# Patient Record
Sex: FEMALE | Race: WHITE | Employment: OTHER | URBAN - METROPOLITAN AREA
[De-identification: names, ages, dates, MRNs, and addresses within clinical notes are randomized per-mention and may not be internally consistent; named-entity substitution may affect disease eponyms.]

---

## 2017-01-03 ENCOUNTER — HOSPITAL ENCOUNTER (OUTPATIENT)
Dept: PHYSICAL THERAPY | Age: 72
Discharge: HOME | End: 2017-01-03
Payer: MEDICARE

## 2017-01-03 PROCEDURE — 97112 NEUROMUSCULAR REEDUCATION: CPT

## 2017-01-03 PROCEDURE — 97110 THERAPEUTIC EXERCISES: CPT

## 2017-01-03 PROCEDURE — 97032 APPL MODALITY 1+ESTIM EA 15: CPT

## 2017-01-03 NOTE — PROGRESS NOTES
PT DAILY TREATMENT NOTE - [facility]     Patient Name: Sharla Vivas  Date:1/3/2017  : 1945  [x]  Patient  Verified  Payor: [place] MED[place] / Plan: [place] MEDICARE PART A & B / Product Type: Medicare /    In time:11:00  Out time:11:52  Total Treatment Time (min): 52  Total Timed Codes (min): 52  1:1 Treatment Time ( only): 40   Visit #: 2 of 4    Treatment Area: Low back pain [M54.5]  Cervicalgia [M54.2]    SUBJECTIVE  Pain Level (0-10 scale): 2 neck  Any medication changes, allergies to medications, adverse drug reactions, diagnosis change, or new procedure performed?: [x] No    [] Yes (see summary sheet for update)  Subjective functional status/changes:   [] No changes reported  \"My neck is bothering me a little this morning but my back and hip are OK\".      OBJECTIVE    Modality rationale: decrease pain and increase tissue extensibility to improve the patients ability to improve activity tolerance   Min Type Additional Details    [] Estim:  []Unatt       []IFC  []Premod                        []Other:  []w/ice   []w/heat  Position:  Location:   8 [x] Estim: [x]Att    []TENS instruct  []NMES                    [x]Other: [employer], 1.0 w/cm2, 1.0 mHz  [x]w/US   []w/ice   []w/heat  Position: sitting  Location: left UT    []  Traction: [] Cervical       []Lumbar                       [] Prone          []Supine                       []Intermittent   []Continuous Lbs:  [] before manual  [] after manual    []  Ultrasound: []Continuous   [] Pulsed                           []1MHz   []3MHz W/cm2:  Location:    []  Iontophoresis with dexamethasone         Location: [] Take home patch   [] In clinic    []  Ice     []  heat  []  Ice massage  []  Laser   []  Anodyne Position:  Location:    []  Laser with stim  []  Other:  Position:  Location:    []  Vasopneumatic Device Pressure:       [] lo [] med [] hi   Temperature: [] lo [] med [] hi   [] Skin assessment post-treatment:  []intact []redness- no adverse reaction []redness  adverse reaction:     29 min Therapeutic Exercise:  [x] See flow sheet :   Rationale: increase ROM and increase strength to improve the patients ability to tolerate ADLs     15 min Neuromuscular Re-education:  [x]  See flow sheet :   Rationale: increase strength, improve coordination and increase proprioception  to improve the patients ability to tolerate functional tasks          With   [] TE   [] TA   [] neuro   [] other: Patient Education: [x] Review HEP    [] Progressed/Changed HEP based on:   [] positioning   [] body mechanics   [] transfers   [] heat/ice application    [] other:      Other Objective/Functional Measures: Increased reps per flow sheet. Added 1# weights to prone letters to improve scapular strength. FOTO: 63 points l/s, 60 points c/s. Pain Level (0-10 scale) post treatment: 1 neck    ASSESSMENT/Changes in Function: Pt reported decreased neck pain at the end of the session. Continue POC as tolerated. Patient will continue to benefit from skilled PT services to modify and progress therapeutic interventions, address functional mobility deficits, address ROM deficits, address strength deficits, analyze and address soft tissue restrictions, analyze and cue movement patterns, analyze and modify body mechanics/ergonomics, assess and modify postural abnormalities and instruct in home and community integration to attain remaining goals. []  See Plan of Care  []  See progress note/recertification  []  See Discharge Summary         Progress towards goals / Updated goals:  Updated Goals to be accomplished in 2 weeks: continue towards unmet goals  1. Improve both C/S and L/S FOTO by >5 points to indicate improved function with daily activities. - Met, FOTO C/S 62%, L/S 59%. 12/12/2016; 63 points l/s, 60 points c/s 1/3/2017  2. Increase B hip strength to grossly to 4/5 to improve tolerate for prolonged standing. - Progressing, MMT (B) hips 4-/5 to 4/5 grossly.  12/20/2016; fatigues with s/l banded exercises 12/30/2016  3. Increase L C/S rot to 50 to increase ease with driving.  Progressed to 42 degrees 12/08/2016, MET 52 degrees 12-21-16    PLAN  [x]  Upgrade activities as tolerated     [x]  Continue plan of care  [x]  Update interventions per flow sheet       []  Discharge due to:_  []  Other:_      Patsy Pereyra PT 1/3/2017  11:40 AM    Future Appointments  Date Time Provider Levi Ramseyi   1/3/2017 11:00 [facility]   1/5/2017 8:30 AM [facility]   1/10/2017 9:30 AM [facility]

## 2017-01-05 ENCOUNTER — HOSPITAL ENCOUNTER (OUTPATIENT)
Dept: PHYSICAL THERAPY | Age: 72
Discharge: HOME | End: 2017-01-05
Payer: MEDICARE

## 2017-01-05 PROCEDURE — 97032 APPL MODALITY 1+ESTIM EA 15: CPT

## 2017-01-05 PROCEDURE — 97112 NEUROMUSCULAR REEDUCATION: CPT

## 2017-01-05 NOTE — PROGRESS NOTES
PT DAILY TREATMENT NOTE - [facility]     Patient Name: Gema Suazo  Date:2017  : 1945  [x]  Patient  Verified  Payor: Kevin Drafts / Plan: [place] MEDICARE PART A & B / Product Type: Medicare /    In time:8:32  Out time:9:08  Total Treatment Time (min): 36  Total Timed Codes (min): 36  1:1 Treatment Time ( [address] only): 36   Visit #: 3 of 4    Treatment Area: Low back pain [M54.5]  Cervicalgia [M54.2]    SUBJECTIVE  Pain Level (0-10 scale): 1 Neck, 2 (R) hip. Any medication changes, allergies to medications, adverse drug reactions, diagnosis change, or new procedure performed?: [x] No    [] Yes (see summary sheet for update)  Subjective functional status/changes:   [x] No changes reported    OBJECTIVE    Modality rationale: decrease pain and increase tissue extensibility to improve the patients ability to perform ADL's.    Min Type Additional Details    [] Estim:  []Unatt       []IFC  []Premod                        []Other:  []w/ice   []w/heat  Position:  Location:   8 [x] Estim: [x]Att    []TENS instruct  []NMES                    [x]Other: [employer] Combo [x]w/US   []w/ice   []w/heat  Position: Seated  Location:(L) UT    []  Traction: [] Cervical       []Lumbar                       [] Prone          []Supine                       []Intermittent   []Continuous Lbs:  [] before manual  [] after manual    []  Ultrasound: []Continuous   [] Pulsed                           []1MHz   []3MHz W/cm2:  Location:    []  Iontophoresis with dexamethasone         Location: [] Take home patch   [] In clinic    []  Ice     []  heat  []  Ice massage  []  Laser   []  Anodyne Position:  Location:    []  Laser with stim  []  Other:  Position:  Location:    []  Vasopneumatic Device Pressure:       [] lo [] med [] hi   Temperature: [] lo [] med [] hi   [] Skin assessment post-treatment:  []intact []redness- no adverse reaction    []redness  adverse reaction:     18 min Therapeutic Exercise:  [x] See flow sheet :   Rationale: increase ROM and increase strength to improve the patients ability to perform ADL's. 10 min Neuromuscular Re-education:  [x]  See flow sheet :   Rationale: increase strength and increase proprioception  to improve the patients ability to perform functional activities. With   [x] TE   [] TA   [] neuro   [] other: Patient Education: [x] Review HEP    [] Progressed/Changed HEP based on:   [] positioning   [] body mechanics   [] transfers   [] heat/ice application    [] other:      Other Objective/Functional Measures: Noted improved tolerance with yellow t-band clamshells, no rest required. Pain Level (0-10 scale) post treatment: Neck 0/10, (R) Hip 2/10    ASSESSMENT/Changes in Function: Pt reports performing ADL's with little to no pain, report evert intensity and frequency has decreased. Patient will continue to benefit from skilled PT services to modify and progress therapeutic interventions, address functional mobility deficits, address ROM deficits, address strength deficits, analyze and address soft tissue restrictions and analyze and modify body mechanics/ergonomics to attain remaining goals. [x]  See Plan of Care  []  See progress note/recertification  []  See Discharge Summary         Progress towards goals / Updated goals:  Updated Goals to be accomplished in 2 weeks: continue towards unmet goals  1. Improve both C/S and L/S FOTO by >5 points to indicate improved function with daily activities. - Met, FOTO C/S 62%, L/S 59%. 12/12/2016; 63 points l/s, 60 points c/s 1/3/2017  2. Increase B hip strength to grossly to 4/5 to improve tolerate for prolonged standing. - Progressing, MMT (B) hips 4-/5 to 4/5 grossly. 12/20/2016; fatigues with s/l banded exercises 12/30/2016; Noted improved tolerance with yellow t-band clamshells, no rest required. 1/5/2017  3. Increase L C/S rot to 50 to increase ease with driving.  Progressed to 42 degrees 12/08/2016, MET 52 degrees 12-21-16    PLAN  []  Upgrade activities as tolerated     [x]  Continue plan of care  []  Update interventions per flow sheet       []  Discharge due to:_  []  Other:_      Esha García PTA 1/5/2017  8:35 AM    Future Appointments  Date Time Provider Levi Bartlett   1/10/2017 9:30 AM Esha García PTA [id number]

## 2017-01-10 ENCOUNTER — APPOINTMENT (OUTPATIENT)
Dept: PHYSICAL THERAPY | Age: 72
End: 2017-01-10
Payer: MEDICARE

## 2017-01-26 ENCOUNTER — HOSPITAL ENCOUNTER (OUTPATIENT)
Dept: PHYSICAL THERAPY | Age: 72
Discharge: HOME | End: 2017-01-26
Payer: MEDICARE

## 2017-01-26 PROCEDURE — G8980 MOBILITY D/C STATUS: HCPCS

## 2017-01-26 PROCEDURE — G8979 MOBILITY GOAL STATUS: HCPCS

## 2017-01-26 PROCEDURE — 97112 NEUROMUSCULAR REEDUCATION: CPT

## 2017-01-26 PROCEDURE — 97110 THERAPEUTIC EXERCISES: CPT

## 2017-01-26 NOTE — PROGRESS NOTES
PT DAILY TREATMENT NOTE - [facility]     Patient Name: Froylan Knight  Date:2017  : 1945  [x]  Patient  Verified  Payor: Trevon Ice / Plan: [place] MEDICARE PART A & B / Product Type: Medicare /    In time:12:00  Out time:12:41  Total Treatment Time (min): 41  Total Timed Codes (min): 41  1:1 Treatment Time ([address] only): 38   Visit #: 4 of 4    Treatment Area: Low back pain [M54.5]  Cervicalgia [M54.2]    SUBJECTIVE  Pain Level (0-10 scale): 1/10  Any medication changes, allergies to medications, adverse drug reactions, diagnosis change, or new procedure performed?: [x] No    [] Yes (see summary sheet for update)  Subjective functional status/changes:   [] No changes reported  Pt reports she traveled to [place] and had no significant difficulties. Pt states she occasionally has LBP/stiffness but is not limited from any normal activity. OBJECTIVE    31 min Therapeutic Exercise:  [x] See flow sheet :   Rationale: increase ROM and increase strength to improve the patients ability to tolerate ADLs. 10 min Neuromuscular Re-education:  [x]  See flow sheet :   Rationale: increase strength, improve coordination and increase proprioception  to improve the patients ability to perform functional activities. With   [] TE   [] TA   [] neuro   [] other: Patient Education: [x] Review HEP    [] Progressed/Changed HEP based on:   [] positioning   [] body mechanics   [] transfers   [] heat/ice application    [] other:      Other Objective/Functional Measures: (B) hip MMT 4/5 all planes. FOTO:  L/S 61; C/S 68     Pain Level (0-10 scale) post treatment: 0/10    ASSESSMENT/Changes in Function: Pt has met all functional goals. Pt demonstrates improved cervical mobility and improved LE strength. Issued updated HEP and DC instructions.       []  See Plan of Care  []  See progress note/recertification  [x]  See Discharge Summary         Progress towards goals / Updated goals:  Updated Goals to be accomplished in 2 weeks: continue towards unmet goals  1. Improve both C/S and L/S FOTO by >5 points to indicate improved function with daily activities. - Met, FOTO C/S 62%, L/S 59%. 12/12/2016; 63 points l/s, 60 points c/s 1/3/2017  2. Increase B hip strength to grossly to 4/5 to improve tolerate for prolonged standing. - (B) hip MMT grossly 4/5. 01/26/17  3. Increase L C/S rot to 50 to increase ease with driving. Progressed to 42 degrees 12/08/2016, MET 52 degrees 12-21-16    PLAN  []  Upgrade activities as tolerated     []  Continue plan of care  []  Update interventions per flow sheet       [x]  Discharge due to:all functional goals met. []  Other:_      Nancy Rojas, PTA 1/26/2017  1:37 PM    No future appointments.

## 2017-02-27 NOTE — PROGRESS NOTES
[facility]  [address]  [facility], [address]  [phone] fax    Physical Therapy Discharge Summary    Patient name: James Calvert Start of Care: 16   Referral source: Stan Nuñez DO : 1945   Medical/Treatment Diagnosis: Low back pain [M54.5]  Cervicalgia [M54.2] Onset Date:1/year ago   Prior Hospitalization: see medical history Provider#: [id number]   Medications: Verified on Patient Summary List     Comorbidities: HTN; OA; skin CA  Prior Level of Function: Able to tolerate prolonged sitting and standing without increase in pain; able to garden without pain    Visits from Start of Care: 12    Missed Visits: 0  Reporting Period : 2016 to 2017    Summary of Care:  Updated Goals to be accomplished in 2 weeks: continue towards unmet goals  1. Improve both C/S and L/S FOTO by >5 points to indicate improved function with daily activities. - Met, FOTO C/S 62%, L/S 59%. 2016; 63 points l/s, 60 points c/s 1/3/2017  2. Increase B hip strength to grossly to 4/5 to improve tolerate for prolonged standing. - (B) hip MMT grossly 4/5. 17  3. Increase L C/S rot to 50 to increase ease with driving. Progressed to 42 degrees 2016, MET 52 degrees 16    G-Codes (GP)  Mobility    Goal  CJ= 20-39%  D/C  CJ= 20-39%    The severity rating is based on clinical judgment and the FOTO score. ASSESSMENT/RECOMMENDATIONS: Pt has met all functional goals. Pt demonstrates improved cervical mobility and improved LE strength. Issued updated HEP and DC instructions.    [x]Discontinue therapy: [x]Patient has reached or is progressing toward set goals      []Patient is non-compliant or has abdicated      []Due to lack of appreciable progress towards set goals    Milan Aranda, PT 2017 12:40 PM

## 2021-11-12 ENCOUNTER — TRANSCRIBE ORDER (OUTPATIENT)
Dept: SCHEDULING | Age: 76
End: 2021-11-12

## 2021-11-12 DIAGNOSIS — R10.11 RUQ ABDOMINAL PAIN: Primary | ICD-10-CM

## 2021-11-16 ENCOUNTER — HOSPITAL ENCOUNTER (OUTPATIENT)
Age: 76
Setting detail: OBSERVATION
Discharge: HOME | End: 2021-11-17
Attending: EMERGENCY MEDICINE | Admitting: SURGERY
Payer: MEDICARE

## 2021-11-16 ENCOUNTER — ANESTHESIA (OUTPATIENT)
Dept: SURGERY | Age: 76
End: 2021-11-16
Payer: MEDICARE

## 2021-11-16 ENCOUNTER — ANESTHESIA EVENT (OUTPATIENT)
Dept: SURGERY | Age: 76
End: 2021-11-16
Payer: MEDICARE

## 2021-11-16 ENCOUNTER — HOSPITAL ENCOUNTER (OUTPATIENT)
Dept: CT IMAGING | Age: 76
Discharge: HOME | End: 2021-11-16

## 2021-11-16 DIAGNOSIS — R10.11 RUQ ABDOMINAL PAIN: ICD-10-CM

## 2021-11-16 DIAGNOSIS — K35.80 ACUTE APPENDICITIS, UNSPECIFIED ACUTE APPENDICITIS TYPE: Primary | ICD-10-CM

## 2021-11-16 PROBLEM — K35.30 ACUTE APPENDICITIS WITH LOCALIZED PERITONITIS, WITHOUT PERFORATION, ABSCESS, OR GANGRENE: Status: ACTIVE | Noted: 2021-11-16

## 2021-11-16 LAB
ANION GAP SERPL CALC-SCNC: 8 MMOL/L (ref 5–15)
BASOPHILS # BLD: 0.1 K/UL (ref 0–0.1)
BASOPHILS NFR BLD: 1 % (ref 0–1)
BUN SERPL-MCNC: 14 MG/DL (ref 6–20)
BUN/CREAT SERPL: 17 (ref 12–20)
CALCIUM SERPL-MCNC: 9.3 MG/DL (ref 8.5–10.1)
CHLORIDE SERPL-SCNC: 104 MMOL/L (ref 97–108)
CO2 SERPL-SCNC: 25 MMOL/L (ref 21–32)
CREAT BLD-MCNC: 0.7 MG/DL (ref 0.6–1.3)
CREAT SERPL-MCNC: 0.83 MG/DL (ref 0.55–1.02)
DIFFERENTIAL METHOD BLD: ABNORMAL
EOSINOPHIL # BLD: 0 K/UL (ref 0–0.4)
EOSINOPHIL NFR BLD: 0 % (ref 0–7)
ERYTHROCYTE [DISTWIDTH] IN BLOOD BY AUTOMATED COUNT: 12.9 % (ref 11.5–14.5)
GLUCOSE SERPL-MCNC: 115 MG/DL (ref 65–100)
HCT VFR BLD AUTO: 37.8 % (ref 35–47)
HGB BLD-MCNC: 12.6 G/DL (ref 11.5–16)
IMM GRANULOCYTES # BLD AUTO: 0.1 K/UL (ref 0–0.04)
IMM GRANULOCYTES NFR BLD AUTO: 1 % (ref 0–0.5)
LACTATE SERPL-SCNC: 1.1 MMOL/L (ref 0.4–2)
LYMPHOCYTES # BLD: 4 K/UL (ref 0.8–3.5)
LYMPHOCYTES NFR BLD: 42 % (ref 12–49)
MCH RBC QN AUTO: 30.7 PG (ref 26–34)
MCHC RBC AUTO-ENTMCNC: 33.3 G/DL (ref 30–36.5)
MCV RBC AUTO: 92 FL (ref 80–99)
MONOCYTES # BLD: 0.8 K/UL (ref 0–1)
MONOCYTES NFR BLD: 8 % (ref 5–13)
NEUTS SEG # BLD: 4.6 K/UL (ref 1.8–8)
NEUTS SEG NFR BLD: 48 % (ref 32–75)
NRBC # BLD: 0 K/UL (ref 0–0.01)
NRBC BLD-RTO: 0 PER 100 WBC
PLATELET # BLD AUTO: 351 K/UL (ref 150–400)
PMV BLD AUTO: 10.5 FL (ref 8.9–12.9)
POTASSIUM SERPL-SCNC: 3.6 MMOL/L (ref 3.5–5.1)
RBC # BLD AUTO: 4.11 M/UL (ref 3.8–5.2)
SODIUM SERPL-SCNC: 137 MMOL/L (ref 136–145)
WBC # BLD AUTO: 9.6 K/UL (ref 3.6–11)

## 2021-11-16 PROCEDURE — 96374 THER/PROPH/DIAG INJ IV PUSH: CPT

## 2021-11-16 PROCEDURE — 93005 ELECTROCARDIOGRAM TRACING: CPT

## 2021-11-16 PROCEDURE — 2709999900 HC NON-CHARGEABLE SUPPLY: Performed by: SURGERY

## 2021-11-16 PROCEDURE — 76010000149 HC OR TIME 1 TO 1.5 HR: Performed by: SURGERY

## 2021-11-16 PROCEDURE — 77030008684 HC TU ET CUF COVD -B: Performed by: ANESTHESIOLOGY

## 2021-11-16 PROCEDURE — 80048 BASIC METABOLIC PNL TOTAL CA: CPT

## 2021-11-16 PROCEDURE — 96375 TX/PRO/DX INJ NEW DRUG ADDON: CPT

## 2021-11-16 PROCEDURE — G0378 HOSPITAL OBSERVATION PER HR: HCPCS

## 2021-11-16 PROCEDURE — 77030031139 HC SUT VCRL2 J&J -A: Performed by: SURGERY

## 2021-11-16 PROCEDURE — 99283 EMERGENCY DEPT VISIT LOW MDM: CPT

## 2021-11-16 PROCEDURE — 74177 CT ABD & PELVIS W/CONTRAST: CPT | Performed by: NURSE PRACTITIONER

## 2021-11-16 PROCEDURE — 74011250636 HC RX REV CODE- 250/636: Performed by: NURSE ANESTHETIST, CERTIFIED REGISTERED

## 2021-11-16 PROCEDURE — 76210000006 HC OR PH I REC 0.5 TO 1 HR: Performed by: SURGERY

## 2021-11-16 PROCEDURE — 77030008756 HC TU IRR SUC STRY -B: Performed by: SURGERY

## 2021-11-16 PROCEDURE — 74011000250 HC RX REV CODE- 250: Performed by: SURGERY

## 2021-11-16 PROCEDURE — 77030012799 HC TRCR GELPRT BLN AMR -B: Performed by: SURGERY

## 2021-11-16 PROCEDURE — 87040 BLOOD CULTURE FOR BACTERIA: CPT

## 2021-11-16 PROCEDURE — 74011000250 HC RX REV CODE- 250: Performed by: NURSE ANESTHETIST, CERTIFIED REGISTERED

## 2021-11-16 PROCEDURE — 36415 COLL VENOUS BLD VENIPUNCTURE: CPT

## 2021-11-16 PROCEDURE — 77030005513 HC CATH URETH FOL11 MDII -B: Performed by: SURGERY

## 2021-11-16 PROCEDURE — 44970 LAPAROSCOPY APPENDECTOMY: CPT | Performed by: SURGERY

## 2021-11-16 PROCEDURE — 77030008771 HC TU NG SALEM SUMP -A: Performed by: ANESTHESIOLOGY

## 2021-11-16 PROCEDURE — 83605 ASSAY OF LACTIC ACID: CPT

## 2021-11-16 PROCEDURE — 74011250637 HC RX REV CODE- 250/637: Performed by: SURGERY

## 2021-11-16 PROCEDURE — 77030009851 HC PCH RTVR ENDOSC AMR -B: Performed by: SURGERY

## 2021-11-16 PROCEDURE — 76060000033 HC ANESTHESIA 1 TO 1.5 HR: Performed by: SURGERY

## 2021-11-16 PROCEDURE — 88304 TISSUE EXAM BY PATHOLOGIST: CPT

## 2021-11-16 PROCEDURE — 77030040361 HC SLV COMPR DVT MDII -B: Performed by: SURGERY

## 2021-11-16 PROCEDURE — 74011250636 HC RX REV CODE- 250/636: Performed by: EMERGENCY MEDICINE

## 2021-11-16 PROCEDURE — 77030027876 HC STPLR ENDOSC FLX PWR J&J -G1: Performed by: SURGERY

## 2021-11-16 PROCEDURE — 77030002933 HC SUT MCRYL J&J -A: Performed by: SURGERY

## 2021-11-16 PROCEDURE — 74011000258 HC RX REV CODE- 258: Performed by: EMERGENCY MEDICINE

## 2021-11-16 PROCEDURE — 74011250636 HC RX REV CODE- 250/636: Performed by: ANESTHESIOLOGY

## 2021-11-16 PROCEDURE — 77030026438 HC STYL ET INTUB CARD -A: Performed by: ANESTHESIOLOGY

## 2021-11-16 PROCEDURE — 77030013079 HC BLNKT BAIR HGGR 3M -A: Performed by: ANESTHESIOLOGY

## 2021-11-16 PROCEDURE — 77030008606 HC TRCR ENDOSC KII AMR -B: Performed by: SURGERY

## 2021-11-16 PROCEDURE — 96365 THER/PROPH/DIAG IV INF INIT: CPT

## 2021-11-16 PROCEDURE — 85025 COMPLETE CBC W/AUTO DIFF WBC: CPT

## 2021-11-16 PROCEDURE — 74011250636 HC RX REV CODE- 250/636

## 2021-11-16 PROCEDURE — 77030009963 HC RELD STPLR ECR J&J -C: Performed by: SURGERY

## 2021-11-16 PROCEDURE — 99219 PR INITIAL OBSERVATION CARE/DAY 50 MINUTES: CPT | Performed by: SURGERY

## 2021-11-16 RX ORDER — HYDROMORPHONE HYDROCHLORIDE 1 MG/ML
0.5 INJECTION, SOLUTION INTRAMUSCULAR; INTRAVENOUS; SUBCUTANEOUS
Status: DISCONTINUED | OUTPATIENT
Start: 2021-11-16 | End: 2021-11-17 | Stop reason: HOSPADM

## 2021-11-16 RX ORDER — GLYCOPYRROLATE 0.2 MG/ML
INJECTION INTRAMUSCULAR; INTRAVENOUS AS NEEDED
Status: DISCONTINUED | OUTPATIENT
Start: 2021-11-16 | End: 2021-11-16 | Stop reason: HOSPADM

## 2021-11-16 RX ORDER — HYDROMORPHONE HYDROCHLORIDE 1 MG/ML
.2-.5 INJECTION, SOLUTION INTRAMUSCULAR; INTRAVENOUS; SUBCUTANEOUS
Status: DISCONTINUED | OUTPATIENT
Start: 2021-11-16 | End: 2021-11-16 | Stop reason: HOSPADM

## 2021-11-16 RX ORDER — EPHEDRINE SULFATE/0.9% NACL/PF 50 MG/5 ML
SYRINGE (ML) INTRAVENOUS AS NEEDED
Status: DISCONTINUED | OUTPATIENT
Start: 2021-11-16 | End: 2021-11-16 | Stop reason: HOSPADM

## 2021-11-16 RX ORDER — HYDROCODONE BITARTRATE AND ACETAMINOPHEN 5; 325 MG/1; MG/1
TABLET ORAL
Status: DISCONTINUED
Start: 2021-11-16 | End: 2021-11-17 | Stop reason: HOSPADM

## 2021-11-16 RX ORDER — MORPHINE SULFATE 2 MG/ML
2 INJECTION, SOLUTION INTRAMUSCULAR; INTRAVENOUS
Status: DISCONTINUED | OUTPATIENT
Start: 2021-11-16 | End: 2021-11-16 | Stop reason: HOSPADM

## 2021-11-16 RX ORDER — HYDROMORPHONE HYDROCHLORIDE 2 MG/ML
INJECTION, SOLUTION INTRAMUSCULAR; INTRAVENOUS; SUBCUTANEOUS AS NEEDED
Status: DISCONTINUED | OUTPATIENT
Start: 2021-11-16 | End: 2021-11-16 | Stop reason: HOSPADM

## 2021-11-16 RX ORDER — ROCURONIUM BROMIDE 10 MG/ML
INJECTION, SOLUTION INTRAVENOUS AS NEEDED
Status: DISCONTINUED | OUTPATIENT
Start: 2021-11-16 | End: 2021-11-16 | Stop reason: HOSPADM

## 2021-11-16 RX ORDER — ONDANSETRON 2 MG/ML
4 INJECTION INTRAMUSCULAR; INTRAVENOUS
Status: DISCONTINUED | OUTPATIENT
Start: 2021-11-16 | End: 2021-11-17 | Stop reason: HOSPADM

## 2021-11-16 RX ORDER — ONDANSETRON 2 MG/ML
4 INJECTION INTRAMUSCULAR; INTRAVENOUS AS NEEDED
Status: DISCONTINUED | OUTPATIENT
Start: 2021-11-16 | End: 2021-11-16 | Stop reason: HOSPADM

## 2021-11-16 RX ORDER — LIDOCAINE HYDROCHLORIDE 20 MG/ML
INJECTION, SOLUTION EPIDURAL; INFILTRATION; INTRACAUDAL; PERINEURAL AS NEEDED
Status: DISCONTINUED | OUTPATIENT
Start: 2021-11-16 | End: 2021-11-16 | Stop reason: HOSPADM

## 2021-11-16 RX ORDER — BARIUM SULFATE 20 MG/ML
900 SUSPENSION ORAL
Status: COMPLETED | OUTPATIENT
Start: 2021-11-16 | End: 2021-11-16

## 2021-11-16 RX ORDER — MIDAZOLAM HYDROCHLORIDE 1 MG/ML
INJECTION, SOLUTION INTRAMUSCULAR; INTRAVENOUS AS NEEDED
Status: DISCONTINUED | OUTPATIENT
Start: 2021-11-16 | End: 2021-11-16 | Stop reason: HOSPADM

## 2021-11-16 RX ORDER — METRONIDAZOLE 500 MG/100ML
500 INJECTION, SOLUTION INTRAVENOUS
Status: COMPLETED | OUTPATIENT
Start: 2021-11-16 | End: 2021-11-16

## 2021-11-16 RX ORDER — SODIUM CHLORIDE, SODIUM LACTATE, POTASSIUM CHLORIDE, CALCIUM CHLORIDE 600; 310; 30; 20 MG/100ML; MG/100ML; MG/100ML; MG/100ML
25 INJECTION, SOLUTION INTRAVENOUS CONTINUOUS
Status: DISCONTINUED | OUTPATIENT
Start: 2021-11-16 | End: 2021-11-16 | Stop reason: HOSPADM

## 2021-11-16 RX ORDER — SUCCINYLCHOLINE CHLORIDE 20 MG/ML
INJECTION INTRAMUSCULAR; INTRAVENOUS AS NEEDED
Status: DISCONTINUED | OUTPATIENT
Start: 2021-11-16 | End: 2021-11-16 | Stop reason: HOSPADM

## 2021-11-16 RX ORDER — DEXTROSE, SODIUM CHLORIDE, AND POTASSIUM CHLORIDE 5; .45; .15 G/100ML; G/100ML; G/100ML
125 INJECTION INTRAVENOUS CONTINUOUS
Status: DISCONTINUED | OUTPATIENT
Start: 2021-11-16 | End: 2021-11-17 | Stop reason: HOSPADM

## 2021-11-16 RX ORDER — HYDROCODONE BITARTRATE AND ACETAMINOPHEN 5; 325 MG/1; MG/1
1 TABLET ORAL
Status: DISCONTINUED | OUTPATIENT
Start: 2021-11-16 | End: 2021-11-17 | Stop reason: HOSPADM

## 2021-11-16 RX ORDER — NEOSTIGMINE METHYLSULFATE 1 MG/ML
INJECTION, SOLUTION INTRAVENOUS AS NEEDED
Status: DISCONTINUED | OUTPATIENT
Start: 2021-11-16 | End: 2021-11-16 | Stop reason: HOSPADM

## 2021-11-16 RX ORDER — DILTIAZEM HYDROCHLORIDE 180 MG/1
180 CAPSULE, EXTENDED RELEASE ORAL DAILY
Status: DISCONTINUED | OUTPATIENT
Start: 2021-11-17 | End: 2021-11-16 | Stop reason: CLARIF

## 2021-11-16 RX ORDER — DILTIAZEM HYDROCHLORIDE 180 MG/1
180 CAPSULE, EXTENDED RELEASE ORAL DAILY
COMMUNITY

## 2021-11-16 RX ORDER — ONDANSETRON 2 MG/ML
INJECTION INTRAMUSCULAR; INTRAVENOUS AS NEEDED
Status: DISCONTINUED | OUTPATIENT
Start: 2021-11-16 | End: 2021-11-16 | Stop reason: HOSPADM

## 2021-11-16 RX ORDER — HYDROMORPHONE HYDROCHLORIDE 1 MG/ML
INJECTION, SOLUTION INTRAMUSCULAR; INTRAVENOUS; SUBCUTANEOUS
Status: DISCONTINUED
Start: 2021-11-16 | End: 2021-11-17 | Stop reason: HOSPADM

## 2021-11-16 RX ORDER — PROPOFOL 10 MG/ML
INJECTION, EMULSION INTRAVENOUS AS NEEDED
Status: DISCONTINUED | OUTPATIENT
Start: 2021-11-16 | End: 2021-11-16 | Stop reason: HOSPADM

## 2021-11-16 RX ORDER — SODIUM CHLORIDE 0.9 % (FLUSH) 0.9 %
5-40 SYRINGE (ML) INJECTION EVERY 8 HOURS
Status: DISCONTINUED | OUTPATIENT
Start: 2021-11-16 | End: 2021-11-17 | Stop reason: HOSPADM

## 2021-11-16 RX ORDER — SODIUM CHLORIDE 0.9 % (FLUSH) 0.9 %
5-40 SYRINGE (ML) INJECTION AS NEEDED
Status: DISCONTINUED | OUTPATIENT
Start: 2021-11-16 | End: 2021-11-17 | Stop reason: HOSPADM

## 2021-11-16 RX ORDER — MIDAZOLAM HYDROCHLORIDE 1 MG/ML
0.5 INJECTION, SOLUTION INTRAMUSCULAR; INTRAVENOUS
Status: DISCONTINUED | OUTPATIENT
Start: 2021-11-16 | End: 2021-11-16 | Stop reason: HOSPADM

## 2021-11-16 RX ORDER — ONDANSETRON 2 MG/ML
INJECTION INTRAMUSCULAR; INTRAVENOUS
Status: DISCONTINUED
Start: 2021-11-16 | End: 2021-11-17 | Stop reason: WASHOUT

## 2021-11-16 RX ORDER — SODIUM CHLORIDE 0.9 % (FLUSH) 0.9 %
5-40 SYRINGE (ML) INJECTION AS NEEDED
Status: DISCONTINUED | OUTPATIENT
Start: 2021-11-16 | End: 2021-11-16 | Stop reason: HOSPADM

## 2021-11-16 RX ORDER — FENTANYL CITRATE 50 UG/ML
50 INJECTION, SOLUTION INTRAMUSCULAR; INTRAVENOUS AS NEEDED
Status: DISCONTINUED | OUTPATIENT
Start: 2021-11-16 | End: 2021-11-16 | Stop reason: HOSPADM

## 2021-11-16 RX ORDER — DEXTROSE, SODIUM CHLORIDE, AND POTASSIUM CHLORIDE 5; .45; .15 G/100ML; G/100ML; G/100ML
INJECTION INTRAVENOUS
Status: COMPLETED
Start: 2021-11-16 | End: 2021-11-16

## 2021-11-16 RX ORDER — SODIUM CHLORIDE 0.9 % (FLUSH) 0.9 %
5-40 SYRINGE (ML) INJECTION EVERY 8 HOURS
Status: DISCONTINUED | OUTPATIENT
Start: 2021-11-16 | End: 2021-11-16 | Stop reason: HOSPADM

## 2021-11-16 RX ORDER — LIDOCAINE HYDROCHLORIDE 10 MG/ML
0.1 INJECTION, SOLUTION EPIDURAL; INFILTRATION; INTRACAUDAL; PERINEURAL AS NEEDED
Status: DISCONTINUED | OUTPATIENT
Start: 2021-11-16 | End: 2021-11-16 | Stop reason: HOSPADM

## 2021-11-16 RX ORDER — FENTANYL CITRATE 50 UG/ML
INJECTION, SOLUTION INTRAMUSCULAR; INTRAVENOUS AS NEEDED
Status: DISCONTINUED | OUTPATIENT
Start: 2021-11-16 | End: 2021-11-16 | Stop reason: HOSPADM

## 2021-11-16 RX ORDER — FENTANYL CITRATE 50 UG/ML
25 INJECTION, SOLUTION INTRAMUSCULAR; INTRAVENOUS
Status: DISCONTINUED | OUTPATIENT
Start: 2021-11-16 | End: 2021-11-16 | Stop reason: HOSPADM

## 2021-11-16 RX ORDER — DEXAMETHASONE SODIUM PHOSPHATE 4 MG/ML
INJECTION, SOLUTION INTRA-ARTICULAR; INTRALESIONAL; INTRAMUSCULAR; INTRAVENOUS; SOFT TISSUE AS NEEDED
Status: DISCONTINUED | OUTPATIENT
Start: 2021-11-16 | End: 2021-11-16 | Stop reason: HOSPADM

## 2021-11-16 RX ORDER — DIPHENHYDRAMINE HYDROCHLORIDE 50 MG/ML
12.5 INJECTION, SOLUTION INTRAMUSCULAR; INTRAVENOUS AS NEEDED
Status: DISCONTINUED | OUTPATIENT
Start: 2021-11-16 | End: 2021-11-16 | Stop reason: HOSPADM

## 2021-11-16 RX ORDER — DILTIAZEM HYDROCHLORIDE 180 MG/1
180 CAPSULE, COATED, EXTENDED RELEASE ORAL DAILY
Status: DISCONTINUED | OUTPATIENT
Start: 2021-11-17 | End: 2021-11-17 | Stop reason: HOSPADM

## 2021-11-16 RX ADMIN — PROPOFOL 150 MG: 10 INJECTION, EMULSION INTRAVENOUS at 17:35

## 2021-11-16 RX ADMIN — Medication 1 AMPULE: at 20:17

## 2021-11-16 RX ADMIN — HYDROMORPHONE HYDROCHLORIDE 0.5 MG: 2 INJECTION, SOLUTION INTRAMUSCULAR; INTRAVENOUS; SUBCUTANEOUS at 18:15

## 2021-11-16 RX ADMIN — Medication 10 MG: at 18:20

## 2021-11-16 RX ADMIN — ROCURONIUM BROMIDE 20 MG: 10 INJECTION INTRAVENOUS at 17:44

## 2021-11-16 RX ADMIN — LIDOCAINE HYDROCHLORIDE 80 MG: 20 INJECTION, SOLUTION INTRAVENOUS at 17:35

## 2021-11-16 RX ADMIN — METRONIDAZOLE 500 MG: 500 INJECTION, SOLUTION INTRAVENOUS at 16:43

## 2021-11-16 RX ADMIN — GLYCOPYRROLATE 0.4 MG: 0.2 INJECTION, SOLUTION INTRAMUSCULAR; INTRAVENOUS at 18:24

## 2021-11-16 RX ADMIN — Medication 10 MG: at 18:23

## 2021-11-16 RX ADMIN — FENTANYL CITRATE 50 MCG: 50 INJECTION, SOLUTION INTRAMUSCULAR; INTRAVENOUS at 17:35

## 2021-11-16 RX ADMIN — SODIUM CHLORIDE 1000 ML: 9 INJECTION, SOLUTION INTRAVENOUS at 15:41

## 2021-11-16 RX ADMIN — DEXAMETHASONE SODIUM PHOSPHATE 8 MG: 4 INJECTION, SOLUTION INTRAMUSCULAR; INTRAVENOUS at 17:42

## 2021-11-16 RX ADMIN — SODIUM CHLORIDE, POTASSIUM CHLORIDE, SODIUM LACTATE AND CALCIUM CHLORIDE: 600; 310; 30; 20 INJECTION, SOLUTION INTRAVENOUS at 17:20

## 2021-11-16 RX ADMIN — ROCURONIUM BROMIDE 10 MG: 10 INJECTION INTRAVENOUS at 17:35

## 2021-11-16 RX ADMIN — HYDROCODONE BITARTRATE AND ACETAMINOPHEN 1 TABLET: 5; 325 TABLET ORAL at 22:49

## 2021-11-16 RX ADMIN — SUGAMMADEX 200 MG: 100 INJECTION, SOLUTION INTRAVENOUS at 18:27

## 2021-11-16 RX ADMIN — BARIUM SULFATE 900 ML: 20 SUSPENSION ORAL at 11:56

## 2021-11-16 RX ADMIN — POTASSIUM CHLORIDE, DEXTROSE MONOHYDRATE AND SODIUM CHLORIDE 125 ML/HR: 150; 5; 450 INJECTION, SOLUTION INTRAVENOUS at 18:48

## 2021-11-16 RX ADMIN — Medication 10 ML: at 22:49

## 2021-11-16 RX ADMIN — LIDOCAINE HYDROCHLORIDE 20 MG: 20 INJECTION, SOLUTION INTRAVENOUS at 17:44

## 2021-11-16 RX ADMIN — Medication 10 MG: at 18:22

## 2021-11-16 RX ADMIN — PROPOFOL 50 MG: 10 INJECTION, EMULSION INTRAVENOUS at 17:44

## 2021-11-16 RX ADMIN — SUCCINYLCHOLINE CHLORIDE 160 MG: 20 INJECTION, SOLUTION INTRAMUSCULAR; INTRAVENOUS at 17:35

## 2021-11-16 RX ADMIN — NEOSTIGMINE METHYLSULFATE 3 MG: 1 INJECTION, SOLUTION INTRAVENOUS at 18:24

## 2021-11-16 RX ADMIN — CEFTRIAXONE 1 G: 1 INJECTION, POWDER, FOR SOLUTION INTRAMUSCULAR; INTRAVENOUS at 15:42

## 2021-11-16 RX ADMIN — MIDAZOLAM HYDROCHLORIDE 2 MG: 1 INJECTION, SOLUTION INTRAMUSCULAR; INTRAVENOUS at 17:31

## 2021-11-16 RX ADMIN — SODIUM CHLORIDE, POTASSIUM CHLORIDE, SODIUM LACTATE AND CALCIUM CHLORIDE: 600; 310; 30; 20 INJECTION, SOLUTION INTRAVENOUS at 18:27

## 2021-11-16 RX ADMIN — ONDANSETRON HYDROCHLORIDE 4 MG: 2 INJECTION, SOLUTION INTRAMUSCULAR; INTRAVENOUS at 18:17

## 2021-11-16 NOTE — ED PROVIDER NOTES
EMERGENCY DEPARTMENT HISTORY AND PHYSICAL EXAM      Date: 11/16/2021  Patient Name: Wilfrid Gore  Patient Age and Sex: 78 y.o. female     History of Presenting Illness     Chief Complaint   Patient presents with    Abnormal Lab Results     pt had an abdominal CT done at [facility] this morning. NP called her today and told her she had acute appendicitis. Sx started last Wednesday, but they are not particularly bothersome today       History Provided By: Patient    HPI: Wilfrid Gore  Is a 78year old history gallstones    She reports pain since Wednesday, persistent pain with difficulty sleeping. Was evaluated by family physician Thursday, recommended outpatient CT performed today. Ongoing moderate pain since then, RLQ and RUQ pain. Abdomen is more swollen than is normal. Appetite is poor. She had a fever of 99 on Thursday, but since then no fever. Single loose stool today. Mild nausea without vomiting. Recent visit to [facility]. Last PO intake was soup and sandwich at 1330. There are no other complaints, changes, or physical findings at this time. PCP: Kaylie Granado NP    Current Facility-Administered Medications on File Prior to Encounter   Medication Dose Route Frequency Provider Last Rate Last Admin    [COMPLETED] barium sulfate (READICAT) 2.1 % (w/v), 2.0 % (w/w) oral suspension 900 mL  900 mL Oral RAD ONCE Starr Bardales MD   900 mL at 11/16/21 1156    [COMPLETED] iopamidoL (ISOVUE-370) 76 % injection 100 mL  100 mL IntraVENous RAD ONCE Starr Bardales MD   100 mL at 11/16/21 1157     Current Outpatient Medications on File Prior to Encounter   Medication Sig Dispense Refill    dilTIAZem ER (Tiazac) 180 mg capsule Take 180 mg by mouth daily.          Past History     Past Medical History:  Past Medical History:   Diagnosis Date    Arthritis     Cancer ([address]     skin     Hypertension       cholelithiasis    Past Surgical History:  Past Surgical History:   Procedure Laterality Date    HX GYN      Ectopic pregnancy    HX HERNIA REPAIR        No previous surgeries    Family History:  History reviewed. No pertinent family history. No pertinent family medical history    Social History:  Social History     Tobacco Use    Smoking status: Never Smoker    Smokeless tobacco: Never Used   Substance Use Topics    Alcohol use: Yes     Alcohol/week: 4.0 standard drinks     Types: 4 Glasses of wine per week    Drug use: Never     Allergies:  No Known Allergies      Review of Systems   Review of Systems   Constitutional: Positive for fever. Gastrointestinal: Positive for abdominal pain, diarrhea, nausea and vomiting. All other systems reviewed and are negative. Physical Exam   Physical Exam  Vitals and nursing note reviewed. Constitutional:       Appearance: Normal appearance. HENT:      Head: Normocephalic. Nose: No congestion. Mouth/Throat:      Mouth: Mucous membranes are dry. Cardiovascular:      Rate and Rhythm: Normal rate and regular rhythm. Pulses: Normal pulses. Pulmonary:      Effort: Pulmonary effort is normal.      Breath sounds: Normal breath sounds. Abdominal:      General: There is distension. Tenderness: There is abdominal tenderness (RLQ). Skin:     General: Skin is warm and dry. Capillary Refill: Capillary refill takes less than 2 seconds. Neurological:      Mental Status: She is alert and oriented to person, place, and time. Mental status is at baseline. Psychiatric:         Behavior: Behavior normal.         Thought Content:  Thought content normal.          Diagnostic Study Results     Labs  Recent Results (from the past 12 hour(s))   CREATININE, POC    Collection Time: 11/16/21 11:48 AM   Result Value Ref Range    Creatinine (POC) 0.70 0.6 - 1.3 mg/dL    GFRAA, POC >60 >60 ml/min/1.73m2    GFRNA, POC >60 >60 ml/min/1.73m2   CBC WITH AUTOMATED DIFF    Collection Time: 11/16/21  3:29 PM   Result Value Ref Range    WBC 9.6 3.6 - 11.0 K/uL    RBC 4.11 3.80 - 5.20 M/uL    HGB 12.6 11.5 - 16.0 g/dL    HCT 37.8 35.0 - 47.0 %    MCV 92.0 80.0 - 99.0 FL    MCH 30.7 26.0 - 34.0 PG    MCHC 33.3 30.0 - 36.5 g/dL    RDW 12.9 11.5 - 14.5 %    PLATELET 374 568 - 233 K/uL    MPV 10.5 8.9 - 12.9 FL    NRBC 0.0 0  WBC    ABSOLUTE NRBC 0.00 0.00 - 0.01 K/uL    NEUTROPHILS 48 32 - 75 %    LYMPHOCYTES 42 12 - 49 %    MONOCYTES 8 5 - 13 %    EOSINOPHILS 0 0 - 7 %    BASOPHILS 1 0 - 1 %    IMMATURE GRANULOCYTES 1 (H) 0.0 - 0.5 %    ABS. NEUTROPHILS 4.6 1.8 - 8.0 K/UL    ABS. LYMPHOCYTES 4.0 (H) 0.8 - 3.5 K/UL    ABS. MONOCYTES 0.8 0.0 - 1.0 K/UL    ABS. EOSINOPHILS 0.0 0.0 - 0.4 K/UL    ABS. BASOPHILS 0.1 0.0 - 0.1 K/UL    ABS. IMM. GRANS. 0.1 (H) 0.00 - 0.04 K/UL    DF AUTOMATED     METABOLIC PANEL, BASIC    Collection Time: 11/16/21  3:29 PM   Result Value Ref Range    Sodium 137 136 - 145 mmol/L    Potassium 3.6 3.5 - 5.1 mmol/L    Chloride 104 97 - 108 mmol/L    CO2 25 21 - 32 mmol/L    Anion gap 8 5 - 15 mmol/L    Glucose 115 (H) 65 - 100 mg/dL    BUN 14 6 - 20 MG/DL    Creatinine 0.83 0.55 - 1.02 MG/DL    BUN/Creatinine ratio 17 12 - 20      GFR est AA >60 >60 ml/min/1.73m2    GFR est non-AA >60 >60 ml/min/1.73m2    Calcium 9.3 8.5 - 10.1 MG/DL   LACTIC ACID    Collection Time: 11/16/21  3:29 PM   Result Value Ref Range    Lactic acid 1.1 0.4 - 2.0 MMOL/L       Radiologic Studies -   No orders to display     CT Results  (Last 48 hours)               11/16/21 1156  CT ABD PELV W CONT Final result    Impression:  Imaging findings suggestive of acute appendicitis. Appendiceal neoplasm is far less likely though remains in the differential for   consideration. There is no associated abscess or drainable fluid collection. Cholelithiasis. Nonobstructive punctate calculus on the left. Severe degenerative change in the lumbar spine with stenosis at L4-5.        The findings were called to Luciana Hayden on 11/16/2021 at 1:15 PM by  Habib.  [phone]           Narrative:      CLINICAL HISTORY: Right upper quadrant pain   INDICATION: Right upper quadrant pain   COMPARISON: None. CONTRAST: 100  ml Isovue 370   TECHNIQUE:    Multislice helical CT was performed of the abdomen and pelvis following   uneventful rapid bolus intravenous contrast administration. Oral contrast was   not administered. Contiguous 5 mm axial images were reconstructed and lung and   soft tissue windows were generated. Coronal and sagittal reformations were   generated. CT dose reduction was achieved through use of a standardized   protocol tailored for this examination and automatic exposure control for dose   modulation. FINDINGS:   LUNG BASES:No mass lesion or effusion. LIVER: Hepatic steatosis There is no intrahepatic duct dilatation. There is no   hepatic parenchymal mass. Hepatic enhancement pattern is within normal limits. Portal vein is patent. GALLBLADDER:  Cholelithiasis    SPLEEN/PANCREAS: Fatty replacement of the enhancement. Small duodenal   diverticulum. CBD prominence is associated with a duodenal diverticulum. .  There   is no pancreatic duct dilatation. There is no evidence of splenomegaly. ADRENALS/KIDNEYS: Punctate left lower pole calculus is nonobstructive. There is   minimal renal cortical thinning. There is no hydronephrosis. There is no renal   mass. There is no perinephric mass. STOMACH: Small to moderate hiatal hernia. COLON AND SMALL BOWEL: Fecal stasis. Colonic diverticulosis. There is no free intraperitoneal air. There is no evidence of incarceration or   obstruction. No mesenteric adenopathy. PERITONEUM: Unremarkable. APPENDIX: Appendiceal enlargement. Appendiceal thickening. Periappendiceal   inflammatory stranding. There is thickening at the base of the cecum. BLADDER/REPRODUCTIVE ORGANS: No mass or calculus. RETROPERITONEUM: Aortic atherosclerotic change. The abdominal aorta is normal in   caliber.  No aneurysm. No retroperitoneal adenopathy. OSSEOUS STRUCTURES: Spondylolisthesis. Severe canal stenosis at L4-5. CXR Results  (Last 48 hours)    None          Medical Decision Making   I am the first provider for this patient. I reviewed the vital signs, available nursing notes, past medical history, past surgical history, family history and social history. Vital Signs-Reviewed the patient's vital signs. Patient Vitals for the past 12 hrs:   Temp Pulse Resp BP SpO2   11/16/21 2249 97.4 °F (36.3 °C) 84 16 130/76 95 %   11/16/21 2200 97.4 °F (36.3 °C) 77 16 121/78 98 %   11/16/21 2100 97.4 °F (36.3 °C) 80 16 (!) 132/92 98 %   11/16/21 1959 97.6 °F (36.4 °C) 76 14 (!) 142/92 96 %   11/16/21 1930 98.7 °F (37.1 °C) 75 13 138/80 96 %   11/16/21 1915  72 12 (!) 142/88 94 %   11/16/21 1900  78 14 (!) 145/77 97 %   11/16/21 1855  78 14 136/79 98 %   11/16/21 1850  77 10 (!) 140/78 98 %   11/16/21 1845  84 14 130/67 97 %   11/16/21 1842 98 °F (36.7 °C) 89 14 (!) 140/75 96 %   11/16/21 1840  97 16 (!) 140/75 95 %   11/16/21 1718 98.5 °F (36.9 °C) 95 18 (!) 164/106 97 %   11/16/21 1512     95 %   11/16/21 1422 98.2 °F (36.8 °C) (!) 115 16 (!) 143/113 99 %       Records Reviewed: Nursing Notes and Old Medical Records    Provider Notes (Medical Decision Making):   Imaging reviewed demonstrating appendiceal thickening with surrounding inflammation concerning for acute appendicitis. Although differential does also include neoplasm. I did asked the patient if she has had recent weight loss, chronic abdominal pain though this episode appears to be fairly acute, favor acute appendicitis. We will obtain lab work, cultures, treat with ciprofloxacin and Flagyl and plan for admission to surgery for elective appendectomy. ED Course:   Initial assessment performed. The patients presenting problems have been discussed, and they are in agreement with the care plan formulated and outlined with them.   I have encouraged them to ask questions as they arise throughout their visit. ED Course as of 11/16/21 2818   Tue Nov 16, 2021   6580 Spoke with nurse who called on behalf of Dr. Catherine Jim who is currently in the OR, will call back as soon as able. [WB]   1628 BMP, CBC, lactate normal [WB]   1629 Dr Anthony Alamo discussed with patient the concern for possible malignancy [WB]      ED Course User Index  [WB] Lisset Cadet MD     Disposition:  Admission Note:  Patient is being admitted to the hospital by Dr. Anthony Alamo, Service: General Surgery. The results of their tests and reasons for their admission have been discussed with them and available family. They convey agreement and understanding for the need to be admitted and for their admission diagnosis. Diagnosis     Clinical Impression:   1. Acute appendicitis, unspecified acute appendicitis type        Attestations:    Natalee Barreto M.D. Please note that this dictation was completed with i[employer]on, the computer voice recognition software. Quite often unanticipated grammatical, syntax, homophones, and other interpretive errors are inadvertently transcribed by the computer software. Please disregard these errors. Please excuse any errors that have escaped final proofreading. Thank you.

## 2021-11-16 NOTE — ANESTHESIA PREPROCEDURE EVALUATION
Relevant Problems   No relevant active problems       Anesthetic History   No history of anesthetic complications            Review of Systems / Medical History  Patient summary reviewed, nursing notes reviewed and pertinent labs reviewed    Pulmonary  Within defined limits                 Neuro/Psych   Within defined limits           Cardiovascular    Hypertension              Exercise tolerance: >4 METS     GI/Hepatic/Renal               Comments: Acute appendicitis with localized peritonitis Endo/Other        Obesity, arthritis and cancer     Other Findings              Physical Exam    Airway  Mallampati: I    Neck ROM: normal range of motion   Mouth opening: Normal     Cardiovascular  Regular rate and rhythm,  S1 and S2 normal,  no murmur, click, rub, or gallop             Dental    Dentition: Caps/crowns     Pulmonary  Breath sounds clear to auscultation               Abdominal  GI exam deferred       Other Findings            Anesthetic Plan    ASA: 2, emergent  Anesthesia type: general    Monitoring Plan: BIS      Induction: Intravenous and RSI  Anesthetic plan and risks discussed with: Patient

## 2021-11-16 NOTE — H&P
Surgery History and Physcial    Subjective:      Boom Velez is a 78 y.o. female who developed RLQ pain and bloating 6 days ago. This resolved 5 days ago and she saw her PCP 4 days ago and had an outpatient CT scheduled for today. The radiologist directed her to the ED for evaluation based on imaging findings. Pt has mild pain in the RLQ, no further N/V/F/C. She denies weight loss. Last colonoscopy was 6 years ago and her 5 year f/u was delayed due to [place]. She has a maternal history of colon cancer. Pt denies any BPR. PMH includes HTN for which she is on oral meds well controlled. No prior abdominal surgery. Patient Active Problem List    Diagnosis Date Noted    Acute appendicitis with localized peritonitis, without perforation, abscess, or gangrene 11/16/2021     No past medical history on file. No past surgical history on file. Social History     Tobacco Use    Smoking status: Not on file    Smokeless tobacco: Not on file   Substance Use Topics    Alcohol use: Not on file      No family history on file. Prior to Admission medications    Not on File     No Known Allergies      Review of Systems   Constitutional: Negative for appetite change, chills, diaphoresis and fever. Respiratory: Negative for shortness of breath and wheezing. Cardiovascular: Negative for chest pain and palpitations. Gastrointestinal: Negative for abdominal pain, diarrhea, nausea and vomiting. Musculoskeletal: Negative for myalgias. Hematological: Does not bruise/bleed easily. All other systems reviewed and are negative. Objective:     Visit Vitals  BP (!) 143/113   Pulse (!) 115   Temp 98.2 °F (36.8 °C)   Resp 16   Ht 5' 5\" (1.651 m)   Wt 187 lb 13.3 oz (85.2 kg)   SpO2 95%   BMI 31.26 kg/m²       Physical Exam  Constitutional:       General: She is not in acute distress. Appearance: She is well-developed. HENT:      Head: Normocephalic and atraumatic.    Eyes:      General: No scleral icterus. Pupils: Pupils are equal, round, and reactive to light. Cardiovascular:      Rate and Rhythm: Normal rate and regular rhythm. Heart sounds: Normal heart sounds. Pulmonary:      Breath sounds: Normal breath sounds. No wheezing or rales. Abdominal:      General: Abdomen is protuberant. Bowel sounds are normal. There is no distension. Palpations: Abdomen is soft. There is no mass. Tenderness: There is abdominal tenderness in the right lower quadrant. There is no guarding or rebound. Negative signs include Walsh's sign, Rovsing's sign, McBurney's sign and psoas sign. Musculoskeletal:         General: Normal range of motion. Lymphadenopathy:      Cervical: No cervical adenopathy. Neurological:      General: No focal deficit present. Mental Status: She is alert and oriented to person, place, and time. Imaging:  images and reports reviewed    Lab Review:    Recent Results (from the past 24 hour(s))   CREATININE, POC    Collection Time: 11/16/21 11:48 AM   Result Value Ref Range    Creatinine (POC) 0.70 0.6 - 1.3 mg/dL    GFRAA, POC >60 >60 ml/min/1.73m2    GFRNA, POC >60 >60 ml/min/1.73m2   CBC WITH AUTOMATED DIFF    Collection Time: 11/16/21  3:29 PM   Result Value Ref Range    WBC 9.6 3.6 - 11.0 K/uL    RBC 4.11 3.80 - 5.20 M/uL    HGB 12.6 11.5 - 16.0 g/dL    HCT 37.8 35.0 - 47.0 %    MCV 92.0 80.0 - 99.0 FL    MCH 30.7 26.0 - 34.0 PG    MCHC 33.3 30.0 - 36.5 g/dL    RDW 12.9 11.5 - 14.5 %    PLATELET 038 317 - 597 K/uL    MPV 10.5 8.9 - 12.9 FL    NRBC 0.0 0  WBC    ABSOLUTE NRBC 0.00 0.00 - 0.01 K/uL    NEUTROPHILS 48 32 - 75 %    LYMPHOCYTES 42 12 - 49 %    MONOCYTES 8 5 - 13 %    EOSINOPHILS 0 0 - 7 %    BASOPHILS 1 0 - 1 %    IMMATURE GRANULOCYTES 1 (H) 0.0 - 0.5 %    ABS. NEUTROPHILS 4.6 1.8 - 8.0 K/UL    ABS. LYMPHOCYTES 4.0 (H) 0.8 - 3.5 K/UL    ABS. MONOCYTES 0.8 0.0 - 1.0 K/UL    ABS. EOSINOPHILS 0.0 0.0 - 0.4 K/UL    ABS.  BASOPHILS 0.1 0.0 - 0.1 K/UL    ABS. IMM. GRANS. 0.1 (H) 0.00 - 0.04 K/UL    DF AUTOMATED     METABOLIC PANEL, BASIC    Collection Time: 11/16/21  3:29 PM   Result Value Ref Range    Sodium 137 136 - 145 mmol/L    Potassium 3.6 3.5 - 5.1 mmol/L    Chloride 104 97 - 108 mmol/L    CO2 25 21 - 32 mmol/L    Anion gap 8 5 - 15 mmol/L    Glucose 115 (H) 65 - 100 mg/dL    BUN 14 6 - 20 MG/DL    Creatinine 0.83 0.55 - 1.02 MG/DL    BUN/Creatinine ratio 17 12 - 20      GFR est AA >60 >60 ml/min/1.73m2    GFR est non-AA >60 >60 ml/min/1.73m2    Calcium 9.3 8.5 - 10.1 MG/DL   LACTIC ACID    Collection Time: 11/16/21  3:29 PM   Result Value Ref Range    Lactic acid 1.1 0.4 - 2.0 MMOL/L         Assessment:     Abdominal pain, suspect acute vs smoldering appendicitis. Possible neoplasm less likely. Discussed this possibility with the patient and her daughter. Plan:     1. I recommend proceeding with Surgery:  Appendectomy and Laparoscopy. 2. Discussed aspects of surgical intervention, methods, risks including by not limited to infection, bleeding, hematoma, and perforation of the intestines or solid organs, conversion to open operation, negative exploration, possible need for colectomy, and the risks of general anesthetic. The patient understands the risks; any and all questions were answered to the patient's satisfaction.

## 2021-11-16 NOTE — PERIOP NOTES
Handoff Report from Operating Room to PACU    Report received from [facility] and [facility] regarding Rush Rout. Surgeon(s):  Delmer Andres MD  And Procedure(s) (LRB):  APPENDECTOMY LAPAROSCOPIC (N/A)  confirmed   with allergies and dressings discussed. Anesthesia type, drugs, patient history, complications, estimated blood loss, vital signs, intake and output, and last pain medication, lines, reversal medications and temperature were reviewed. 1937- TRANSFER - OUT REPORT:    Verbal report given to [facility] RN(name) on Florence Rout  being transferred to [facility](unit) for routine post - op       Report consisted of patients Situation, Background, Assessment and   Recommendations(SBAR). Information from the following report(s) OR Summary, Procedure Summary, Intake/Output and MAR was reviewed with the receiving nurse. Lines:   Peripheral IV 11/16/21 Left Antecubital (Active)   Site Assessment Clean, dry, & intact 11/16/21 1930   Phlebitis Assessment 0 11/16/21 1930   Infiltration Assessment 0 11/16/21 1930   Dressing Status Clean, dry, & intact 11/16/21 1930   Dressing Type Tape; Transparent 11/16/21 1930   Hub Color/Line Status Pink; Infusing 11/16/21 1930        Opportunity for questions and clarification was provided. Patient transported with:   O2 @ 2l liters  Registered Nurse  Tech   Chart  Patients daughter updated on patient care and new room number.

## 2021-11-16 NOTE — PERIOP NOTES
TRANSFER - IN REPORT:    Verbal report received from [facility] on [place] Ana Luisa  being received from ER 30 for ordered procedure      Report consisted of patients Situation, Background, Assessment and   Recommendations(SBAR). Information from the following report(s) SBAR, ED Summary, Procedure Summary, Intake/Output and MAR was reviewed with the receiving nurse. Opportunity for questions and clarification was provided. Assessment completed upon patients arrival to unit and care assumed.

## 2021-11-16 NOTE — OP NOTES
[address]  OPERATIVE REPORT     PATIENT:  Elías Young OF BIRTH:  1945    PATIENT ID: [id number]    DATE OF OPERATION: 11/16/2021    PREOPERATIVE DIAGNOSIS:  Acute Appendicitis    POSTOPERATIVE DIAGNOSIS:  Acute Appendicitis    PROCEDURE:  Laparoscopic Appendectomy    SURGEON:  Crystal Winter MD, FACS. ANESTHESIA:  General Endotrachial Anesthesia  Circ-1: Stefani Ruiz RN  Scrub Tech-1: Vishaljailene Cespedes  Surg Asst-1: Audria Lundborg      FINDINGS:  Acute appendicitis without perforation or abscess    SPECIMENS REMOVED:  Appendix and Mesoappendix    DESCRIPTION OF OPERATION:  After appropriate consent was obtained, the patient who was competent was brought to the operating room, made comfortable in the supine position, and administered general endotracheal anesthesia. Villafuerte catheter was placed, and the patient was prepped and draped in standard fashion. A 0.5% Marcaine solution was used to infiltrate the planned trocar sites. A 15 blade was then used to incise just under the umbilicus and this was extended sharply down to and through the midline fascia. A Massey trocar was placed, and the abdominal cavity was insufflated with CO2 gas to 15 mm Hg pressure. Under direct visualization, two 5 mm trocars were placed in the lower abdomen. Using standard laparoscopic technique, the abdominal cavity was explored. The appendix was identified and found to be pathologically enlarged and inflamed. The base of the appendix was identified, and a window was made in the mesentery at the appendiceal base. An Endo-PABLO vascular staple cartridge was then fired across the appendix at the base, ligating and dividing it. A second firing of an Endo-PABLO vascular staple cartridge was used to divide the mesoappendix; and when this was all freed, the appendix was placed in an endoscopic retrieval bag and withdrawn from the abdomen through the umbilical trocar site.  The staple lines were inspected and found to be hemostatic. The abdominal cavity was further explored, and no other pathologic findings noted, particularly there were no fluid collections. The trocars were removed under direct visualization without evidence of bleeding. The umbilical fascial defect was approximated with 0-Vicryl suture, and the skin incisions were closed with 5-0 Monocryl subcuticular stitch. The wounds were cleaned and dried and dressed with Dermabond. The patient was awakened, extubated, and transferred to the recovery room in good condition. There were no operative complications. There was minimal blood loss during the case. Fan Herrera.  Caroline Rasmussen MD, [facility]

## 2021-11-17 VITALS
TEMPERATURE: 97.7 F | SYSTOLIC BLOOD PRESSURE: 111 MMHG | BODY MASS INDEX: 31.29 KG/M2 | HEIGHT: 65 IN | DIASTOLIC BLOOD PRESSURE: 57 MMHG | WEIGHT: 187.83 LBS | HEART RATE: 80 BPM | OXYGEN SATURATION: 97 % | RESPIRATION RATE: 16 BRPM

## 2021-11-17 LAB
ATRIAL RATE: 83 BPM
CALCULATED P AXIS, ECG09: 28 DEGREES
CALCULATED R AXIS, ECG10: 4 DEGREES
CALCULATED T AXIS, ECG11: -10 DEGREES
DIAGNOSIS, 93000: NORMAL
P-R INTERVAL, ECG05: 162 MS
Q-T INTERVAL, ECG07: 392 MS
QRS DURATION, ECG06: 88 MS
QTC CALCULATION (BEZET), ECG08: 460 MS
VENTRICULAR RATE, ECG03: 83 BPM

## 2021-11-17 PROCEDURE — 74011250636 HC RX REV CODE- 250/636: Performed by: SURGERY

## 2021-11-17 PROCEDURE — G0378 HOSPITAL OBSERVATION PER HR: HCPCS

## 2021-11-17 PROCEDURE — 77010033678 HC OXYGEN DAILY

## 2021-11-17 PROCEDURE — 74011250637 HC RX REV CODE- 250/637: Performed by: SURGERY

## 2021-11-17 PROCEDURE — 94760 N-INVAS EAR/PLS OXIMETRY 1: CPT

## 2021-11-17 RX ORDER — HYDROCODONE BITARTRATE AND ACETAMINOPHEN 5; 325 MG/1; MG/1
1 TABLET ORAL
Qty: 10 TABLET | Refills: 0 | Status: SHIPPED | OUTPATIENT
Start: 2021-11-17 | End: 2021-11-20

## 2021-11-17 RX ADMIN — Medication 1 AMPULE: at 09:07

## 2021-11-17 RX ADMIN — HYDROCODONE BITARTRATE AND ACETAMINOPHEN 1 TABLET: 5; 325 TABLET ORAL at 10:51

## 2021-11-17 RX ADMIN — HYDROCODONE BITARTRATE AND ACETAMINOPHEN 1 TABLET: 5; 325 TABLET ORAL at 03:00

## 2021-11-17 RX ADMIN — Medication 10 ML: at 06:44

## 2021-11-17 RX ADMIN — POTASSIUM CHLORIDE, DEXTROSE MONOHYDRATE AND SODIUM CHLORIDE 125 ML/HR: 150; 5; 450 INJECTION, SOLUTION INTRAVENOUS at 02:56

## 2021-11-17 RX ADMIN — DILTIAZEM HYDROCHLORIDE 180 MG: 180 CAPSULE, EXTENDED RELEASE ORAL at 09:07

## 2021-11-17 NOTE — PROGRESS NOTES
Pt ready for discharge from a CM standpoint. Pt's daughter to transport home. Nurse, Maricel Green notified. Transition of Care Plan:    RUR: not applicable, patient admitted under OBS status  Disposition:  Home with follow up appointments and family assistance  Follow up appointments:  PCP (office to call patient) Surgery, Dr. Oswaldo Ulloa (December 2, 2021 1:00pm)  DME needed:  No DME needed for discharge  Transportation at Discharge: Daughter to transport in route to hospital this morning  [place] or means to access home:    Pt has access to her home   IM Medicare Letter:  OBS letters given to patient, no IMM letter required  Is patient a BCPI-A Bundle:   Not identified as a bundle patient        If yes, was Bundle Letter given?:     Caregiver Contact:  DaughterAllan ([phone])  Discharge Caregiver contacted prior to discharge? Pt has spoken with daughter and informed of discharge plan. Daughter to transport patient home. CM met with patient in room. Pt alert and oriented, able to confirm demographics. Pt states she resides in her own private residence that is 2 stories with her bedroom on 1st floor. Pt reports that her daughter and grandchildren live very close by and she has lots of family support. Pt provider OBS letters. Oral and Written notification given to patient and/or caregiver informing them that they are currently an Outpatient receiving care in our facility. Outpatient services include Observation Services. Pt states that she will be transported home by her daughterAllan at discharge.       Reason for Admission:  Acute appendicitis                    RUR Score:  N/A                   Plan for utilizing home health:  No current needs for [place]        PCP: First and Last name:  Kayla Friday, CM     Name of Practice:  [facility] practice   Are you a current patient: Yes/No: yes   Approximate date of last visit:  A few months ago   Can you participate in a virtual visit with your PCP: yes                    Current Advanced Directive/Advance Care Plan: Full Code      Healthcare Decision Maker:   Click here to complete [address] including selection of the Healthcare Decision Maker Relationship (ie \"Primary\")    Pt does not have ACP at hospital.  States her daughter would make medical decisions if needed                     Transition of Care Plan:  Home with follow up appointments and family assistance    Care Management Interventions  PCP Verified by CM: Yes (couple months ago)  Discharge Durable Medical Equipment: No  Physical Therapy Consult: No  Occupational Therapy Consult: No  Speech Therapy Consult: No  Support Systems: Child(sheela), Friend/Neighbor  Discharge Location  Discharge Placement: Home with family assistance    Chad Cadet.  Benjamin Warren, [address] [place] - [address] [place]  [employer] ACP Facilitator  Zone Phone: [phone]

## 2021-11-17 NOTE — PROGRESS NOTES
End of Shift Note    Bedside shift change report given to Lisa Sung and Mirella Corea RN (oncoming nurse) by Mikell Simmonds, RN (offgoing nurse). Report included the following information SBAR, Kardex, Intake/Output, MAR and Recent Results    Shift worked:  7p-7a     Shift summary and any significant changes:     Pt arrived to unit at 2000. A&O x4. Pt weaned off oxygen. Abd incisions CDI. SCDs on. Vitals stable. Pain meds given twice. Pt ambulating to bathroom. Pt has voided after quintero removal. Incentive spirometer at bedside. Pt stated she's passing gas this am. Daughter at bedside last night. Concerns for physician to address:       Zone phone for oncoming shift:   [phone]       Activity:  Activity Level: Up with Assistance  Number times ambulated in hallways past shift: 0  Number of times OOB to chair past shift: 0    Cardiac:   Cardiac Monitoring: No      Cardiac Rhythm: Sinus Rhythm    Access:   Current line(s): PIV     Genitourinary:   Urinary status: voiding    Respiratory:   O2 Device: Nasal cannula  Chronic home O2 use?: NO  Incentive spirometer at bedside: YES  Actual Volume (ml): 1000 ml  GI:  Last Bowel Movement Date: 11/16/21  Current diet:  ADULT DIET Clear Liquid  Passing flatus: YES  Tolerating current diet: YES       Pain Management:   Patient states pain is manageable on current regimen: YES    Skin:  Heraclio Score: 21  Interventions: float heels and increase time out of bed    Patient Safety:  Fall Score:  Total Score: 1  Interventions: gripper socks, pt to call before getting OOB and stay with me (per policy)       Length of Stay:  Expected LOS: - - -  Actual LOS: 0      Mikell Simmonds, RN

## 2021-11-17 NOTE — DISCHARGE INSTRUCTIONS
Appendectomy: What to Expect at Home  Your Recovery     Your doctor removed your appendix either by making many small cuts, called incisions, in your belly (laparoscopic surgery) or through open surgery. In open surgery, the doctor makes one large incision. The incisions leave scars that usually fade over time. After your surgery, it is normal to feel weak and tired for several days after you return home. Your belly may be swollen and may be painful. If you had laparoscopic surgery, you may have pain in your shoulder for about 24 hours. You may also feel sick to your stomach and have diarrhea, constipation, gas, or a headache. This usually goes away in a few days. Your recovery time depends on the type of surgery you had. If you had laparoscopic surgery, you will probably be able to return to work or a normal routine 1 to 3 weeks after surgery. If you had an open surgery, it may take 2 to 4 weeks. If your appendix ruptured, you may have a drain in your incision. Your body will work fine without an appendix. You will not have to make any changes in your diet or lifestyle. This care sheet gives you a general idea about how long it will take for you to recover. But each person recovers at a different pace. Follow the steps below to get better as quickly as possible. How can you care for yourself at home? Activity  · Rest when you feel tired. Getting enough sleep will help you recover. · Try to walk each day. Start by walking a little more than you did the day before. Bit by bit, increase the amount you walk. Walking boosts blood flow and helps prevent pneumonia and constipation. · For about 2 weeks, avoid lifting anything that would make you strain. This may include a child, heavy grocery bags and milk containers, a heavy briefcase or backpack, cat litter or dog food bags, or a vacuum .   · Avoid strenuous activities, such as bicycle riding, jogging, weight lifting, or aerobic exercise, until your doctor says it is okay. · You may be able to take showers (unless you have a drain near your incision) 24 to 48 hours after surgery. Pat the incision dry. Do not take a bath for the first 2 weeks, or until your doctor tells you it is okay. If you have a drain near your incision, follow your doctor's instructions. · You may drive when you are no longer taking pain medicine and can quickly move your foot from the gas pedal to the brake. You must also be able to sit comfortably for a long period of time, even if you do not plan on going far. You might get caught in traffic. · You will probably be able to go back to work in 1 to 3 weeks. If you had an open surgery, it may take 3 to 4 weeks. · Your doctor will tell you when you can have sex again. Diet  · You can eat your normal diet. If your stomach is upset, try bland, low-fat foods like plain rice, broiled chicken, toast, and yogurt. · Drink plenty of fluids (unless your doctor tells you not to). · You may notice that your bowel movements are not regular right after your surgery. This is common. Try to avoid constipation and straining with bowel movements. You may want to take a fiber supplement every day. If you have not had a bowel movement after a couple of days, ask your doctor about taking a mild laxative. Medicines  · If your appendix ruptured, you will need to take antibiotics. Take them as directed. Do not stop taking them just because you feel better. You need to take the full course of antibiotics. · Be safe with medicines. Take pain medicines exactly as directed. ¨ If the doctor gave you a prescription medicine for pain, take it as prescribed. ¨ If you are not taking a prescription pain medicine, take an over-the-counter medicine such as acetaminophen (Tylenol), ibuprofen (Advil, Motrin), or naproxen (Aleve). Read and follow all instructions on the label. ¨ Do not take two or more pain medicines at the same time unless the doctor told you to. Many pain medicines have acetaminophen, which is Tylenol. Too much Tylenol can be harmful. · If you think your pain medicine is making you sick to your stomach:  ¨ Take your medicine after meals (unless your doctor has told you not to). ¨ Ask your doctor for a different pain medicine. Incision care  · If you had an open surgery, you may have staples in your incision. The doctor will take these out in 7 to 10 days. · If you have strips of tape on the incision, leave the tape on for a week or until it falls off. · You may wash the area with warm, soapy water 24 to 48 hours after your surgery, unless your doctor tells you not to. Pat the area dry. · Keep the area clean and dry. You may cover it with a gauze bandage if it weeps or rubs against clothing. Change the bandage every day. · If your appendix ruptured, you may have an incision with packing in it. Change the packing as often as your doctor tells you to. ¨ Packing changes may hurt at first. Taking pain medicine about half an hour before you change the dressing can help. ¨ If your dressing sticks to your wound, try soaking it with warm water for about 10 minutes before you remove it. You can do this in the shower or by placing a wet washcloth over the dressing. ¨ Remove the old packing and flush the incision with water. Gently pat the top area dry. ¨ The size of the incision determines how much gauze you need to put inside. Fold the gauze over once, but do not wad it up so that it hurts. Put it in the wound carefully. You want to keep the sides of the wound from touching. A cotton swab may help you push the gauze in as needed. ¨ Put a gauze pad over the wound, and tape it down. ¨ You may notice greenish gray fluid seeping from your wound as you start to heal. This is normal. It is a sign that your wound is healing. Follow-up care is a key part of your treatment and safety.  Be sure to make and go to all appointments, and call your doctor if you are having problems. It's also a good idea to know your test results and keep a list of the medicines you take. When should you call for help? Call 911 anytime you think you may need emergency care. For example, call if:  · You passed out (lost consciousness). · You have sudden chest pain and shortness of breath, or you cough up blood. · You have severe trouble breathing. Call your doctor now or seek immediate medical care if:  · You are sick to your stomach and cannot drink fluids. · You have severe diarrhea. · You have pain that does not get better when you take your pain medicine. · You have signs of infection, such as:  ¨ Increased pain, swelling, warmth, or redness. ¨ Red streaks leading from the wound. ¨ Pus draining from the wound. ¨ A fever. · You have loose stitches, or your incision comes open. · Bright red blood has soaked through a large bandage. · You have signs of a blood clot, such as:  ¨ Pain in your calf, back of knee, thigh, or groin. ¨ Redness and swelling in your leg or groin. Watch closely for any changes in your health, and be sure to contact your doctor if:  · You had a laparoscopic surgery and your shoulder pain lasts more than 24 hours. · You have leakage around your drain or you have no new fluid in the drain for 24 hours. · The amount of drainage suddenly increases, or the color and texture changes. · You do not have a bowel movement after taking a laxative. Where can you learn more? Go to [employer]  Enter X801 in the search box to learn more about \"Appendectomy: What to Expect at Home. \"   © 9216-8500 Healthwise, Incorporated. Care instructions adapted under license by [employer] (which disclaims liability or warranty for this information).  This care instruction is for use with your licensed healthcare professional. If you have questions about a medical condition or this instruction, always ask your healthcare professional. [employer] disclaims any warranty or liability for your use of this information.   Content Version: 85.4.387264; Current as of: November 14, 2014

## 2021-11-17 NOTE — PROGRESS NOTES
Patient discharged without incident. Prn norco given at discharge per pt request to prevent pain or discomfort during transport home. Discharge instructions reviewed, pt verbalized understanding. Copy of discharge instructions given to patient. Pt states that she has all belongings with her. IV removed without incident. Pt discharged ambulatory with her daughter.

## 2021-11-17 NOTE — DISCHARGE SUMMARY
Post- Surgical Discharge Summary    Patient ID:  Diana Bunch  [id number]  female  78 y.o.  1945    Admit date: 11/16/2021    Discharge date: 11/17/2021    Admitting Physician: Avril Ray MD     Consulting Physician(s):   Treatment Team: Attending Provider: Sharon Casanova MD; Consulting Provider: Sharon Casanova MD; Staff Nurse: Tarun Lerma, RN; Primary Nurse: Santhosh Hudson RN    Date of Surgery:   11/16/2021     Preoperative Diagnosis:  ACUTE APPENDICITIS    Postoperative Diagnosis:   ACUTE APPENDICITIS    Procedure(s):  APPENDECTOMY LAPAROSCOPIC     Anesthesia Type:   General     Surgeon: Sharon Casanova MD                            HPI:  Pt is a 78 y.o. female who has a history of ACUTE APPENDICITIS who presents at this time for a lap appendectomy following the failure of conservative management. Problem List:   Problem List as of 11/17/2021 Date Reviewed: 11/16/2021          Codes Class Noted - Resolved    * (Principal) Acute appendicitis with localized peritonitis, without perforation, abscess, or gangrene ICD-10-CM: K35.30  ICD-9-CM: 540.9  11/16/2021 - Present               Hospital Course: The patient underwent surgery. Intra-operative complications: None; patient tolerated the procedure well. Was taken to the PACU in stable condition and then transferred to the surgical floor. Pathology is pending. Surgeon will follow results as outpatient. Perioperative Antibiotics: Ceftriaxone and Flagyl    Postoperative Pain Management:  Norco    Postoperative transfusions:    Number of units banked PRBCs =   none     Post Op complications: None     Incisions  - clean, dry and intact. No significant erythema or swelling. Wound(s) appear to be healing without any evidence of infection. Patient mobilized with nursing and was found to be safe and steady with ambulation.      Discharged to: Home     Condition on Discharge: Stable     Discharge instructions:    - Take pain medications as prescribed  - Diet Regular  - Discharge activity:    - Activity as tolerated    - Ambulate several times a day   - No heavy lifting for 4 weeks   - Do not drive for two weeks or while on opioid pain medications  - Wound Care: Keep wound(s) clean and dry. See discharge instruction sheet. Allergies:  No Known Allergies           -DISCHARGE MEDICATION LIST     Current Discharge Medication List      START taking these medications    Details   HYDROcodone-acetaminophen (NORCO) 5-325 mg per tablet Take 1 Tablet by mouth every six (6) hours as needed for Pain for up to 3 days. Max Daily Amount: 4 Tablets. Qty: 10 Tablet, Refills: 0  Start date: 11/17/2021, End date: 11/20/2021    Associated Diagnoses: Acute appendicitis, unspecified acute appendicitis type         CONTINUE these medications which have NOT CHANGED    Details   dilTIAZem ER (Tiazac) 180 mg capsule Take 180 mg by mouth daily. per medical continuation form      -Follow up in office in 2 weeks      Signed:  Tiffany Mccarthy  MSN, APRN, FNP-C, [facility]  Surgical Nurse Practitioner    11/17/2021  8:49 AM

## 2021-11-21 LAB
BACTERIA SPEC CULT: NORMAL
SERVICE CMNT-IMP: NORMAL

## 2021-12-02 ENCOUNTER — OFFICE VISIT (OUTPATIENT)
Dept: SURGERY | Age: 76
End: 2021-12-02
Payer: MEDICARE

## 2021-12-02 VITALS
BODY MASS INDEX: 30.95 KG/M2 | HEART RATE: 83 BPM | OXYGEN SATURATION: 98 % | TEMPERATURE: 97.6 F | SYSTOLIC BLOOD PRESSURE: 138 MMHG | WEIGHT: 186 LBS | RESPIRATION RATE: 16 BRPM | DIASTOLIC BLOOD PRESSURE: 80 MMHG

## 2021-12-02 DIAGNOSIS — Z09 POSTOPERATIVE EXAMINATION: Primary | ICD-10-CM

## 2021-12-02 PROCEDURE — 99024 POSTOP FOLLOW-UP VISIT: CPT | Performed by: SURGERY

## 2021-12-02 NOTE — PROGRESS NOTES
Identified pt with two pt identifiers(name and ). Reviewed record in preparation for visit and have obtained necessary documentation. All patient medications has been reviewed. Chief Complaint   Patient presents with    Surgical Follow-up     S/P APPENDECTOMY LAPAROSCOPIC 21       Health Maintenance Due   Topic    Hepatitis C Screening     COVID-19 Vaccine (1)    DTaP/Tdap/Td series (1 - Tdap)    Lipid Screen     Colorectal Cancer Screening Combo     Shingrix Vaccine Age 50> (1 of 2)    Breast Cancer Screen Mammogram     Bone Densitometry (Dexa) Screening     Pneumococcal 65+ years (1 of 1 - PPSV23)    Flu Vaccine (1)    Medicare Yearly Exam        Vitals:    21 1302   BP: 138/80   Pulse: 83   Resp: 16   Temp: 97.6 °F (36.4 °C)   SpO2: 98%   Weight: 84.4 kg (186 lb)   PainSc:   0 - No pain       4. Have you been to the ER, urgent care clinic since your last visit? Hospitalized since your last visit? No    5. Have you seen or consulted any other health care providers outside of the [address] [place] [address] since your last visit? Include any pap smears or colon screening. No      Patient is accompanied by self I have received verbal consent from Saran Wilkerson to discuss any/all medical information while they are present in the room.

## 2021-12-02 NOTE — LETTER
12/2/2021    Patient: Mo Arenas   YOB: 1945   Date of Visit: 12/2/2021     Modesta Wood NP  [address]  [address] [address]  Via Fax: [phone]     Debra Mondragon MD  [address] [place] [address]    Dear CM Moore MD,      Thank you for referring Ms. Casandra Marroquin to [address]  for evaluation. My notes for this consultation are attached. If you have questions, please do not hesitate to call me. I look forward to following your patient along with you.       Sincerely,    Chevy Diaz MD

## 2021-12-02 NOTE — PROGRESS NOTES
Postop Progress Note    Subjective    Alexa Corey presents to the office for postop follow up s/p lap appendectomy. Objective    There were no vitals taken for this visit. General: alert, cooperative and no distress  Incision: healing well    Assessment  Doing well postoperatively. Pathology benign. Plan  1. Continue any current medications  2. Wound care discussed  3. Pt is to increase activities as tolerated  4. Usual diet  5.  Follow up: as needed    Electronically signed by Maricruz Purcell MD on 12/2/2021 at 1:00 PM

## 2022-03-20 PROBLEM — K35.30 ACUTE APPENDICITIS WITH LOCALIZED PERITONITIS, WITHOUT PERFORATION, ABSCESS, OR GANGRENE: Status: ACTIVE | Noted: 2021-11-16

## 2023-05-12 RX ORDER — DILTIAZEM HYDROCHLORIDE 180 MG/1
180 CAPSULE, EXTENDED RELEASE ORAL DAILY
COMMUNITY

## 2025-02-17 ENCOUNTER — HOSPITAL ENCOUNTER (EMERGENCY)
Facility: HOSPITAL | Age: 80
Discharge: HOME | End: 2025-02-17
Attending: STUDENT IN AN ORGANIZED HEALTH CARE EDUCATION/TRAINING PROGRAM
Payer: MEDICARE

## 2025-02-17 ENCOUNTER — APPOINTMENT (OUTPATIENT)
Facility: HOSPITAL | Age: 80
End: 2025-02-17
Payer: MEDICARE

## 2025-02-17 VITALS
DIASTOLIC BLOOD PRESSURE: 64 MMHG | HEART RATE: 77 BPM | TEMPERATURE: 98.2 F | WEIGHT: 191.14 LBS | SYSTOLIC BLOOD PRESSURE: 126 MMHG | RESPIRATION RATE: 14 BRPM | OXYGEN SATURATION: 95 % | BODY MASS INDEX: 31.85 KG/M2 | HEIGHT: 65 IN

## 2025-02-17 DIAGNOSIS — S00.03XA HEMATOMA OF SCALP, INITIAL ENCOUNTER: ICD-10-CM

## 2025-02-17 DIAGNOSIS — S09.90XA INJURY OF HEAD, INITIAL ENCOUNTER: Primary | ICD-10-CM

## 2025-02-17 PROCEDURE — 99284 EMERGENCY DEPT VISIT MOD MDM: CPT

## 2025-02-17 PROCEDURE — 6360000002 HC RX W HCPCS: Performed by: STUDENT IN AN ORGANIZED HEALTH CARE EDUCATION/TRAINING PROGRAM

## 2025-02-17 PROCEDURE — 90471 IMMUNIZATION ADMIN: CPT | Performed by: STUDENT IN AN ORGANIZED HEALTH CARE EDUCATION/TRAINING PROGRAM

## 2025-02-17 PROCEDURE — 70486 CT MAXILLOFACIAL W/O DYE: CPT

## 2025-02-17 PROCEDURE — 70450 CT HEAD/BRAIN W/O DYE: CPT

## 2025-02-17 PROCEDURE — 96372 THER/PROPH/DIAG INJ SC/IM: CPT

## 2025-02-17 PROCEDURE — 90715 TDAP VACCINE 7 YRS/> IM: CPT | Performed by: STUDENT IN AN ORGANIZED HEALTH CARE EDUCATION/TRAINING PROGRAM

## 2025-02-17 RX ADMIN — TETANUS TOXOID, REDUCED DIPHTHERIA TOXOID AND ACELLULAR PERTUSSIS VACCINE, ADSORBED 0.5 ML: 5; 2.5; 8; 8; 2.5 SUSPENSION INTRAMUSCULAR at 15:42

## 2025-02-17 ASSESSMENT — PAIN SCALES - GENERAL: PAINLEVEL_OUTOF10: 2

## 2025-02-17 ASSESSMENT — PAIN - FUNCTIONAL ASSESSMENT: PAIN_FUNCTIONAL_ASSESSMENT: ACTIVITIES ARE NOT PREVENTED

## 2025-02-17 ASSESSMENT — PAIN DESCRIPTION - LOCATION: LOCATION: FACE

## 2025-02-17 ASSESSMENT — PAIN DESCRIPTION - DESCRIPTORS: DESCRIPTORS: DULL

## 2025-02-17 NOTE — ED PROVIDER NOTES
EMERGENCY DEPARTMENT HISTORY AND PHYSICAL EXAM      Date: 2/17/2025  Patient Name: Velma Burns    History of Presenting Illness     Chief Complaint   Patient presents with    Head Injury     Pt ambulatory with cc of GLF, striking her head last PM. Pt states has been taking nyquil for cold like sxs and got up to let the dog out, was a little unsteady and fell into the desk. NO loc or blood thinners. Pt has bruising under both eyes and forehead. Pt came from PCP for CT scan.          HPI: History From: patient, History limited by: none  Velma Burns, 79 y.o. female presents to the ED with cc of head injury.  She started to have symptoms of sinusitis 2 nights ago.  She took ibuprofen and NyQuil.  She got up last night to take her dog out, when she tripped on a blanket on the ground.  She hit her head.  She denies loss of consciousness.  She denies any pain currently.  No neck or back pain, no weakness numbness or tingling in arms or legs.  She is unsure when her last tetanus shot was.  She takes 81 mg aspirin daily otherwise no blood thinners.          There are no other complaints, changes, or physical findings at this time.    PCP: Malena Natarajan APRN - NP    No current facility-administered medications on file prior to encounter.     Current Outpatient Medications on File Prior to Encounter   Medication Sig Dispense Refill    dilTIAZem (TIAZAC) 180 MG extended release capsule Take 1 capsule by mouth daily         Past History     Past Medical History:  Past Medical History:   Diagnosis Date    Arthritis     Cancer (HCC)     skin     Hypertension        Past Surgical History:  Past Surgical History:   Procedure Laterality Date    GYN      Ectopic pregnancy    HERNIA REPAIR         Family History:  No family history on file.    Social History:  Social History     Tobacco Use    Smoking status: Never    Smokeless tobacco: Never   Substance Use Topics    Alcohol use: Yes     Alcohol/week: 4.0 standard drinks  Mohs Case Number (Optional): [id number] Mohs Case Number (Optional): [id number]

## 2025-04-03 NOTE — ANESTHESIA POSTPROCEDURE EVALUATION
Procedure(s):  APPENDECTOMY LAPAROSCOPIC. general    Anesthesia Post Evaluation        Patient location during evaluation: PACU  Note status: Adequate. Level of consciousness: responsive to verbal stimuli and sleepy but conscious  Pain management: satisfactory to patient  Airway patency: patent  Anesthetic complications: no  Cardiovascular status: acceptable  Respiratory status: acceptable  Hydration status: acceptable  Comments: +Post-Anesthesia Evaluation and Assessment    Patient: Antoni Ricardo MRN: [id number]  SSN: [id number]   YOB: 1945  Age: 78 y.o. Sex: female      Cardiovascular Function/Vital Signs    BP (!) 142/88 (BP 1 Location: Right upper arm, BP Patient Position: At rest)   Pulse 72   Temp 36.7 °C (98 °F)   Resp 12   Ht 5' 5\" (1.651 m)   Wt 85.2 kg (187 lb 13.3 oz)   SpO2 94%   BMI 31.26 kg/m²     Patient is status post Procedure(s):  APPENDECTOMY LAPAROSCOPIC. Nausea/Vomiting: Controlled. Postoperative hydration reviewed and adequate. Pain:  Pain Scale 1: Numeric (0 - 10) (11/16/21 1915)  Pain Intensity 1: 0 (11/16/21 1915)   Managed. Neurological Status:   Neuro (WDL): Exceptions to WDL (11/16/21 1840)   At baseline. Mental Status and Level of Consciousness: Arousable. Pulmonary Status:   O2 Device: Nasal cannula (11/16/21 1915)   Adequate oxygenation and airway patent. Complications related to anesthesia: None    Post-anesthesia assessment completed. No concerns. Signed By: Daphne Ojeda MD    11/16/2021  Post anesthesia nausea and vomiting:  controlled      INITIAL Post-op Vital signs:   Vitals Value Taken Time   /88 11/16/21 1915   Temp 36.7 °C (98 °F) 11/16/21 1842   Pulse 76 11/16/21 1922   Resp 12 11/16/21 1922   SpO2 95 % 11/16/21 1922   Vitals shown include unvalidated device data. Detail Level: Detailed

## (undated) DEVICE — GARMENT,MEDLINE,DVT,INT,CALF,MED, GEN2: Brand: MEDLINE

## (undated) DEVICE — STAPLER INT L340MM 45MM STD 12 FIRING B FRM PWR + GRIPPING

## (undated) DEVICE — GOWN,PREVENTION PLUS,XL,ST,24/CS: Brand: MEDLINE

## (undated) DEVICE — TOTAL TRAY, 16FR 10ML SIL FOLEY, URN: Brand: MEDLINE

## (undated) DEVICE — Device

## (undated) DEVICE — GLOVE SURG SZ 75 CRM LTX FREE POLYISOPRENE POLYMER BEAD ANTI

## (undated) DEVICE — SYR 10ML CTRL LR LCK NSAF LF --

## (undated) DEVICE — TISSUE RETRIEVAL SYSTEM: Brand: INZII RETRIEVAL SYSTEM

## (undated) DEVICE — KIT,1200CC CANISTER,3/16"X6' TUBING: Brand: MEDLINE INDUSTRIES, INC.

## (undated) DEVICE — GLOVE SURG SZ 8 L12IN FNGR THK79MIL GRN LTX FREE

## (undated) DEVICE — SUTURE SZ 0 27IN 5/8 CIR UR-6  TAPER PT VIOLET ABSRB VICRYL J603H

## (undated) DEVICE — TROCARS: Brand: KII® BALLOON BLUNT TIP SYSTEM

## (undated) DEVICE — TROCAR: Brand: KII FIOS FIRST ENTRY

## (undated) DEVICE — SUTURE MCRYL SZ 4-0 L27IN ABSRB UD L19MM PS-2 1/2 CIR PRIM Y426H

## (undated) DEVICE — 40580 - THE PINK PAD - ADVANCED TRENDELENBURG POSITIONING KIT: Brand: 40580 - THE PINK PAD - ADVANCED TRENDELENBURG POSITIONING KIT

## (undated) DEVICE — EVAC SMOKE SEECLEAR XCL -- SEE CLEAR

## (undated) DEVICE — GENERAL LAPAROSCOPY-MRMC: Brand: MEDLINE INDUSTRIES, INC.

## (undated) DEVICE — GLOVE SURG SZ 8 CRM LTX FREE POLYISOPRENE POLYMER BEAD ANTI

## (undated) DEVICE — RELOAD STPL L45MM H1-2.6MM MESENTERY THN TISS WHT GRIPPING

## (undated) DEVICE — NEEDLE HYPO 25GA L1.5IN BVL ORIENTED ECLIPSE